# Patient Record
Sex: MALE | Employment: FULL TIME | URBAN - METROPOLITAN AREA
[De-identification: names, ages, dates, MRNs, and addresses within clinical notes are randomized per-mention and may not be internally consistent; named-entity substitution may affect disease eponyms.]

---

## 2017-01-26 ENCOUNTER — TELEPHONE (OUTPATIENT)
Dept: FAMILY MEDICINE | Facility: CLINIC | Age: 32
End: 2017-01-26

## 2017-01-26 ENCOUNTER — OFFICE VISIT (OUTPATIENT)
Dept: FAMILY MEDICINE | Facility: CLINIC | Age: 32
End: 2017-01-26

## 2017-01-26 VITALS
DIASTOLIC BLOOD PRESSURE: 99 MMHG | BODY MASS INDEX: 27.32 KG/M2 | WEIGHT: 190.8 LBS | SYSTOLIC BLOOD PRESSURE: 139 MMHG | OXYGEN SATURATION: 99 % | HEIGHT: 70 IN | HEART RATE: 86 BPM | TEMPERATURE: 99 F

## 2017-01-26 DIAGNOSIS — R20.0 NUMBNESS AND TINGLING: ICD-10-CM

## 2017-01-26 DIAGNOSIS — R20.2 NUMBNESS AND TINGLING: ICD-10-CM

## 2017-01-26 PROBLEM — M41.113 JUVENILE IDIOPATHIC SCOLIOSIS OF CERVICOTHORACIC REGION: Status: ACTIVE | Noted: 2017-01-26

## 2017-01-26 LAB
FOLATE SERPL-MCNC: 9.1 NG/ML
TSH SERPL DL<=0.05 MIU/L-ACNC: 1.53 UIU/ML (ref 0.3–5)
VIT B12 SERPL-MCNC: 792 PG/ML (ref 213–816)

## 2017-01-26 RX ORDER — PREDNISONE 20 MG/1
60 TABLET ORAL DAILY
Qty: 15 TABLET | Refills: 0 | Status: SHIPPED
Start: 2017-01-26 | End: 2017-02-13

## 2017-01-26 NOTE — PROGRESS NOTES
"SUBJECTIVE:  Adeola Del Angel is a 31 year old male with a PMH of     Patient Active Problem List   Diagnosis     Mild major depression (H)     Depression with anxiety     Juvenile idiopathic scoliosis of cervicothoracic region     Who presents for evaluation of   Chief Complaint   Patient presents with     Back Pain     have back pain for couple years now per pt.      Other     have tingling and numbness on hands and legs since yesterday per pt.    He reports a history of scoliosis and chronic back pain in thoracic area.  He notes frequent subscapular discomfort.  Last night had symptoms of upper and lower extremities are falling asleep.  No history of trauma.  Worsens with laborious work, improves with rest.     No chest pain, no shortness of breath, no nausea or vomiting, no symptoms concerning for cauda equina.  No fevers.    Mother notes that his father recently had a stroke and is in the hospital and so family is concerned about Adeola's new symptoms.    Smokes 1/2 ppd, Occasional Etoh.      OBJECTIVE:  /99 mmHg  Pulse 86  Temp(Src) 99  F (37.2  C) (Oral)  Ht 5' 10\" (177.8 cm)  Wt 190 lb 12.8 oz (86.546 kg)  BMI 27.38 kg/m2  SpO2 99%  EXAM:  Constitutional: healthy, alert and no distress   NEURO: Gait normal. Reflexes normal and symmetric. Sensation grossly WNL.  JOINT/EXTREMITIES: extremities normal- no gross deformities noted, gait normal and normal muscle tone      ASSESSMENT:  Adeola was seen today for back pain and other.    Diagnoses and all orders for this visit:    Numbness and tingling  -     predniSONE (DELTASONE) 20 MG tablet; Take 3 tablets (60 mg) by mouth daily  -     TSH  Sensitive ([facility])  -     Vitamin B12 ([facility])  -     Vitamin D 25-Hydroxy ([facility])  -     Folate  Serum ([facility])  -     Cancel: XR Lumbar Spine 1 View; Future  -     Cancel: XR Thoracic Spine 1 View; Future  -     Cancel: XR Cervical Spine 1 View; Future  -     XR Lumbar Spine 2-3 Views*  -     XR " CERVICAL SPINE 2/3 VWS  -     XR THORACIC SPINE 3 VW      Total of 20 minutes was spent in face to face contact with patient with > 50% in counseling and coordination of care.  Options for treatment and/or follow-up care were reviewed with the patient. Adeola Del Angel was engaged and actively involved in the decision making process. He verbalized understanding of the options discussed and was satisfied with the final plan.  RTC in 1 week for follow up of Numbness and tingling or sooner if develops new or worsening symptoms.    Discussed with Dr Amelia Briggs.     Jhon Cole    Addendum:  1/31/2017  Called and spoke with patient regarding Xray results, L, T and C spine images negative.   Will order PT.  Steroids were mildly helpful.    Started megadosing of Vitamin D.    Jhon Cole MD

## 2017-01-26 NOTE — NURSING NOTE
Do you feel safe at home?  Yes  Do you feel safe in your relationship?  Yes  Have there been threats or abuse to you or your family?  No

## 2017-01-26 NOTE — PATIENT INSTRUCTIONS
Get Xrays done    Get blood drawn.    Start taking prednisone 60 mg once a day for 5 days    Make a follow up appointment for early next week    [facility]  [phone]  Referral has been faxed they will contact pt to schedule

## 2017-01-26 NOTE — MR AVS SNAPSHOT
After Visit Summary   2017    Adeola Del Angel    MRN: [id number]           Patient Information     Date Of Birth          1985        Visit Information        Provider Department      2017 1:10 PM Jhon Cole MD [facility]        Today's Diagnoses     Numbness and tingling           Care Instructions    Get Xrays done    Get blood drawn.    Start taking prednisone 60 mg once a day for 5 days    Make a follow up appointment for early next week        Follow-ups after your visit        Future tests that were ordered for you today     Open Future Orders        Priority Expected Expires Ordered    XR Lumbar Spine 1 View Routine 2017    XR Thoracic Spine 1 View Routine 2017    XR Cervical Spine 1 View Routine 2017            Who to contact     Please call your clinic at [phone] to:    Ask questions about your health    Make or cancel appointments    Discuss your medicines    Learn about your test results    Speak to your doctor   If you have compliments or concerns about an experience at your clinic, or if you wish to file a complaint, please contact [facility] Patient Relations at [phone] or email us at [email or web address]         Additional Information About Your Visit        MyChart Information     [employer] is an electronic gateway that provides easy, online access to your medical records. With [employer], you can request a clinic appointment, read your test results, renew a prescription or communicate with your care team.     To sign up for [employer] visit the website at [email or web address]   You will be asked to enter the access code listed below, as well as some personal information. Please follow the directions to create your username and password.     Your access code is: [email or web address]  Expires: 2017  1:48 PM     Your access code will  in  "90 days. If you need help or a new code, please contact your [facility] or call [phone] for assistance.        Care EveryWhere ID     This is your Care EveryWhere ID. This could be used by other organizations to access your [place] medical records  [id number]        Your Vitals Were     Pulse Temperature Height BMI (Body Mass Index) Pulse Oximetry       86 99  F (37.2  C) (Oral) 5' 10\" (177.8 cm) 27.38 kg/m2 99%        Blood Pressure from Last 3 Encounters:   01/26/17 139/99   10/21/16 137/89   08/22/16 134/91    Weight from Last 3 Encounters:   01/26/17 190 lb 12.8 oz (86.546 kg)   10/21/16 190 lb 12.8 oz (86.546 kg)   08/22/16 191 lb (86.637 kg)              We Performed the Following     Folate  Serum ([facility])     TSH  Sensitive ([facility])     Vitamin B12 ([facility])     Vitamin D 25-Hydroxy ([facility])          Today's Medication Changes          These changes are accurate as of: 1/26/17  1:48 PM.  If you have any questions, ask your nurse or doctor.               Start taking these medicines.        Dose/Directions    predniSONE 20 MG tablet   Commonly known as:  DELTASONE   Used for:  Numbness and tingling   Started by:  Jhon Cole MD        Dose:  60 mg   Take 3 tablets (60 mg) by mouth daily   Quantity:  15 tablet   Refills:  0            Where to get your medicines      These medications were sent to [facility] - [place], [place] - [address], [place] [address]     Phone:  [phone]    - predniSONE 20 MG tablet             Primary Care Provider Office Phone # Fax #    Obed Gomez Perez [phone]       [address] [address]        Thank you!     Thank you for choosing [facility]  for your care. Our goal is always to provide you with excellent care. Hearing back from our patients is one way we can continue to improve our services. Please take a few minutes " to complete the written survey that you may receive in the mail after your visit with us. Thank you!             Your Updated Medication List - Protect others around you: Learn how to safely use, store and throw away your medicines at www.disposemymeds.org.          This list is accurate as of: 1/26/17  1:48 PM.  Always use your most recent med list.                   Brand Name Dispense Instructions for use    acetaminophen 500 MG tablet    TYLENOL    100 tablet    Take 2 tablets (1,000 mg) by mouth every 6 hours as needed for mild pain       * Alum & Mag Hydroxide-Simeth 225-200-25 MG/5ML Susp     1120 mL    Take 1 capful by mouth 4 times daily as needed.       * alum & mag hydroxide-simethicone 200-200-20 MG/5ML Susp suspension    MYLANTA/MAALOX    1 Bottle    Take 30 mLs by mouth every 4 hours as needed for indigestion       escitalopram 10 MG tablet    LEXAPRO    30 tablet    Take 1 tablet (10 mg) by mouth daily       LANsoprazole 30 MG CR capsule    PREVACID    30 capsule    Take 1 capsule by mouth daily. Take 30-60 minutes before a meal.       omeprazole 20 MG CR capsule    priLOSEC    30 capsule    Take 1 capsule (20 mg) by mouth 2 times daily       PEPCID 20 MG tablet   Generic drug:  famotidine      Take 20 mg by mouth 2 times daily.       polyethylene glycol powder    MIRALAX    510 g    Take 17 g by mouth daily.       predniSONE 20 MG tablet    DELTASONE    15 tablet    Take 3 tablets (60 mg) by mouth daily       psyllium 0.52 G capsule     100 capsule    Take 1 capsule by mouth daily.       * Notice:  This list has 2 medication(s) that are the same as other medications prescribed for you. Read the directions carefully, and ask your doctor or other care provider to review them with you.

## 2017-01-26 NOTE — TELEPHONE ENCOUNTER
Patient calling with c/o back pain with leg/ arm numbness that started yesterday. He states the sensation in his extremities feel like they have went to sleep but he is still able to move them appropriately. He has been told in the past that he has had some muscle problems so he is not sure if he injured himself unknowingly or turned wrong or something. Nurse suggested the patient come in to be seen today vs waiting until tomorrow. Patient verbalized understanding and was transferred to appt. Appt was scheduled for an appt this afternoon with Dr. Turner. /NYA Adams      Routed to Dr. Cole. /Bryan RN

## 2017-01-26 NOTE — Clinical Note
January 31, 2017        Adeola Del Angel  [address] [place] [address]    To Whom it May Concern:    Adeola Del Angel was seen in our office on 1/31/2017 and was given the following instructions:  Patient is able to lift 10 pounds at a time and Patient is able to stand/walk for up to 8 hours.    Sincerely,        Jhon Cole MD

## 2017-01-26 NOTE — TELEPHONE ENCOUNTER
[facility] phone call message- general phone call:    Reason for call: He is having back pain and now his legs are arms are tingling and feeling numb. He wants to know if he should schedule today or go to work tonight and come in tomorrow.    Return call needed: Yes    OK to leave a message on voice mail? Yes    Primary language: English      needed? No    Call taken on January 26, 2017 at 10:45 AM by Pushpa Castañeda

## 2017-01-26 NOTE — PROGRESS NOTES
"Preceptor attestation:  Vital signs reviewed: /99 mmHg  Pulse 86  Temp(Src) 99  F (37.2  C) (Oral)  Ht 5' 10\" (177.8 cm)  Wt 190 lb 12.8 oz (86.546 kg)  BMI 27.38 kg/m2  SpO2 99%    Patient seen and discussed with the resident.  Assessment and plan reviewed with resident and agreed upon.    Supervising physician: Amelia Briggs  [facility]  "

## 2017-01-26 NOTE — Clinical Note
January 31, 2017        Adeola Del Angel  [address] [place] [address]    To Whom it May Concern:    Adeola Del Angel was seen in our office on 1/26/2017 and was given the following instructions:  Patient is able to lift 10 pounds at a time. and Patient is able to stand/walk for up to 8 hours.    Sincerely,        Jhon Cole MD

## 2017-01-27 LAB — 25(OH)D3 SERPL-MCNC: 9 NG/ML (ref 30–80)

## 2017-01-27 ASSESSMENT — PATIENT HEALTH QUESTIONNAIRE - PHQ9: SUM OF ALL RESPONSES TO PHQ QUESTIONS 1-9: 4

## 2017-01-30 ENCOUNTER — TELEPHONE (OUTPATIENT)
Dept: FAMILY MEDICINE | Facility: CLINIC | Age: 32
End: 2017-01-30

## 2017-01-30 NOTE — TELEPHONE ENCOUNTER
[facility] phone call message- patient requesting results:    Test: Lab/ X-RAY     Date of test: LAST WEEK     Additional Comments: Would like a call back in regards to his lab results and x-ray. Please call back.     OK to leave a message on voice mail? Yes    Primary language: English      needed? No    Call taken on January 30, 2017 at 9:00 AM by Shane Guzman

## 2017-01-30 NOTE — TELEPHONE ENCOUNTER
Component      Latest Ref Rng 1/26/2017   TSH      0.30 - 5.00 uIU/mL 1.53   Vitamin B12      213 - 816 pg/mL 792   Vitamin D,25-Hydroxy      30.0 - 80.0 ng/mL 9.0 (L)   Folate      >=3.5 ng/mL 9.1     Gave msg above.   should he take vit D supplements.   I advised pt on some Vit D rich foods (cereal, vit D fortified orange juice). Pt also would like results of x-ray. Waiting for note from MD.     Routed to Dr. Cole. /NYA Hanna

## 2017-01-31 ENCOUNTER — TELEPHONE (OUTPATIENT)
Dept: FAMILY MEDICINE | Facility: CLINIC | Age: 32
End: 2017-01-31

## 2017-01-31 RX ORDER — ERGOCALCIFEROL 1.25 MG/1
50000 CAPSULE, LIQUID FILLED ORAL
Qty: 8 CAPSULE | Refills: 0 | Status: SHIPPED
Start: 2017-01-31 | End: 2017-03-22

## 2017-01-31 NOTE — TELEPHONE ENCOUNTER
[facility] phone call message- general phone call:    Reason for call: Pt is calling for lab results. He is wondering about the results because he is wondering if the doctor will need him to have an MRI or not.  Please call    Return call needed: Yes    OK to leave a message on voice mail? Yes    Primary language: English      needed? No    Call taken on January 31, 2017 at 10:20 AM by Lana Dixon

## 2017-02-02 ENCOUNTER — AMBULATORY - HEALTHEAST (OUTPATIENT)
Dept: ADMINISTRATIVE | Facility: REHABILITATION | Age: 32
End: 2017-02-02

## 2017-02-02 DIAGNOSIS — R20.0 NUMBNESS: ICD-10-CM

## 2017-02-02 DIAGNOSIS — R20.2 TINGLING: ICD-10-CM

## 2017-02-03 NOTE — PROGRESS NOTES
Quick Note:    Discussed patients results of imaging over the phone, ordered PT.  Jhon Cole    ______

## 2017-02-13 ENCOUNTER — OFFICE VISIT (OUTPATIENT)
Dept: FAMILY MEDICINE | Facility: CLINIC | Age: 32
End: 2017-02-13

## 2017-02-13 VITALS
TEMPERATURE: 98.3 F | HEART RATE: 79 BPM | DIASTOLIC BLOOD PRESSURE: 100 MMHG | WEIGHT: 195 LBS | SYSTOLIC BLOOD PRESSURE: 147 MMHG | BODY MASS INDEX: 27.98 KG/M2

## 2017-02-13 DIAGNOSIS — M54.6 CHRONIC MIDLINE THORACIC BACK PAIN: Primary | ICD-10-CM

## 2017-02-13 DIAGNOSIS — G89.29 CHRONIC MIDLINE THORACIC BACK PAIN: Primary | ICD-10-CM

## 2017-02-13 RX ORDER — NAPROXEN SODIUM 500 MG/1
500 TABLET, FILM COATED, EXTENDED RELEASE ORAL 2 TIMES DAILY
Qty: 60 TABLET | Refills: 0 | Status: SHIPPED
Start: 2017-02-13 | End: 2017-11-22

## 2017-02-13 NOTE — PROGRESS NOTES
SUBJECTIVE:  Adeola Del Angel is a 31 year old male with a PMH of    Patient Active Problem List   Diagnosis     Mild major depression (H)     Depression with anxiety     Juvenile idiopathic scoliosis of cervicothoracic region     Who presents for evaluation of   Chief Complaint   Patient presents with     RECHECK     Follow up on back pain, back is still in pain      Continues to have back pain in thoracic region, Pain 7-8/10, not taking any medications.  Numbness and tingling have resolved.    Denies chest pain, shortness, no nausea, vomiting, no changes in voiding or stooling, no loss of bowel or bladder.       OBJECTIVE:  BP (!) 147/100  Pulse 79  Temp 98.3  F (36.8  C) (Oral)  Wt 195 lb (88.5 kg)  BMI 27.98 kg/m2  EXAM:  Constitutional: healthy, alert and no distress   Cardiovascular: PMI normal. No lifts, heaves, or thrills. RRR. No murmurs, clicks gallops or rub  Respiratory: Percussion normal. Good diaphragmatic excursion. Lungs clear  JOINT/EXTREMITIES: extremities normal- no gross deformities noted, gait normal, normal muscle tone and Back pain is not reproducible, no significant paraspinal muscle spasm.      ASSESSMENT:  Adeola was seen today for recheck.  Will have patient schedule PT.    Diagnoses and all orders for this visit:    Chronic midline thoracic back pain  -     naproxen sodium 500 MG TB24; Take 500 mg by mouth 2 times daily    Total of 20 minutes was spent in face to face contact with patient with > 50% in counseling and coordination of care.  Options for treatment and/or follow-up care were reviewed with the patient. Adeola Del Angel was engaged and actively involved in the decision making process. He verbalized understanding of the options discussed and was satisfied with the final plan.  RTC in 1 month for follow up of back pain or sooner if develops new or worsening symptoms.    Discussed with Dr Dieudonne Chaidez.     Jhon Cole MD

## 2017-02-13 NOTE — MR AVS SNAPSHOT
After Visit Summary   2017    Adeola Del Angel    MRN: [id number]           Patient Information     Date Of Birth          1985        Visit Information        Provider Department      2017 4:10 PM Jhon Cole MD [facility]        Today's Diagnoses     Chronic midline thoracic back pain    -  1       Follow-ups after your visit        Who to contact     Please call your clinic at [phone] to:    Ask questions about your health    Make or cancel appointments    Discuss your medicines    Learn about your test results    Speak to your doctor   If you have compliments or concerns about an experience at your clinic, or if you wish to file a complaint, please contact [facility] Patient Relations at [phone] or email us at [email or web address]         Additional Information About Your Visit        MyChart Information     [employer] is an electronic gateway that provides easy, online access to your medical records. With [employer], you can request a clinic appointment, read your test results, renew a prescription or communicate with your care team.     To sign up for [employer] visit the website at [email or web address]   You will be asked to enter the access code listed below, as well as some personal information. Please follow the directions to create your username and password.     Your access code is: [email or web address]  Expires: 2017  1:48 PM     Your access code will  in 90 days. If you need help or a new code, please contact your [facility] or call [phone] for assistance.        Care EveryWhere ID     This is your Care EveryWhere ID. This could be used by other organizations to access your [place] medical records  [id number]        Your Vitals Were     Pulse Temperature BMI (Body Mass Index)             79 98.3  F (36.8  C) (Oral) 27.98 kg/m2          Blood Pressure from Last 3 Encounters:    02/13/17 (!) 147/100   01/26/17 (!) 139/99   10/21/16 137/89    Weight from Last 3 Encounters:   02/13/17 195 lb (88.5 kg)   01/26/17 190 lb 12.8 oz (86.5 kg)   10/21/16 190 lb 12.8 oz (86.5 kg)              Today, you had the following     No orders found for display         Today's Medication Changes          These changes are accurate as of: 2/13/17 11:59 PM.  If you have any questions, ask your nurse or doctor.               Start taking these medicines.        Dose/Directions    naproxen sodium 500 MG Tb24   Used for:  Chronic midline thoracic back pain   Started by:  Jhon Cole MD        Dose:  500 mg   Take 500 mg by mouth 2 times daily   Quantity:  60 tablet   Refills:  0            Where to get your medicines      These medications were sent to [facility] - [place], [place] - [address], [place] [address]     Phone:  [phone]     naproxen sodium 500 MG Tb24                Primary Care Provider Office Phone # Fax #    Obed Perez [phone]       [address] [address]        Thank you!     Thank you for choosing [facility]  for your care. Our goal is always to provide you with excellent care. Hearing back from our patients is one way we can continue to improve our services. Please take a few minutes to complete the written survey that you may receive in the mail after your visit with us. Thank you!             Your Updated Medication List - Protect others around you: Learn how to safely use, store and throw away your medicines at www.disposemymeds.org.          This list is accurate as of: 2/13/17 11:59 PM.  Always use your most recent med list.                   Brand Name Dispense Instructions for use    naproxen sodium 500 MG Tb24     60 tablet    Take 500 mg by mouth 2 times daily       vitamin D 95239 UNIT capsule    ERGOCALCIFEROL    8 capsule    Take 1 capsule (50,000 Units) by mouth  every 7 days for 8 doses

## 2017-02-14 NOTE — PROGRESS NOTES
Preceptor attestation:  Patient seen and discussed with the resident. Assessment and plan reviewed with resident and agreed upon.  Supervising physician: Dieudonne Chaidez  [facility]

## 2017-04-27 ENCOUNTER — OFFICE VISIT (OUTPATIENT)
Dept: FAMILY MEDICINE | Facility: CLINIC | Age: 32
End: 2017-04-27

## 2017-04-27 VITALS
OXYGEN SATURATION: 99 % | HEIGHT: 70 IN | BODY MASS INDEX: 27.95 KG/M2 | HEART RATE: 98 BPM | WEIGHT: 195.25 LBS | SYSTOLIC BLOOD PRESSURE: 147 MMHG | DIASTOLIC BLOOD PRESSURE: 94 MMHG

## 2017-04-27 DIAGNOSIS — Z20.2 EXPOSURE TO STD: Primary | ICD-10-CM

## 2017-04-27 DIAGNOSIS — I10 BENIGN ESSENTIAL HYPERTENSION: ICD-10-CM

## 2017-04-27 PROBLEM — R03.0 ELEVATED BLOOD PRESSURE READING WITHOUT DIAGNOSIS OF HYPERTENSION: Status: ACTIVE | Noted: 2017-04-27

## 2017-04-27 PROBLEM — R03.0 ELEVATED BLOOD PRESSURE READING WITHOUT DIAGNOSIS OF HYPERTENSION: Status: RESOLVED | Noted: 2017-04-27 | Resolved: 2017-04-27

## 2017-04-27 RX ORDER — AZITHROMYCIN 500 MG/1
1000 TABLET, FILM COATED ORAL ONCE
Qty: 2 TABLET | Refills: 0 | Status: SHIPPED | OUTPATIENT
Start: 2017-04-27 | End: 2017-04-27

## 2017-04-27 RX ORDER — CEFTRIAXONE SODIUM 250 MG/1
250 INJECTION, POWDER, FOR SOLUTION INTRAMUSCULAR; INTRAVENOUS ONCE
Qty: 2.5 ML | Refills: 0 | OUTPATIENT
Start: 2017-04-27 | End: 2017-04-27

## 2017-04-27 ASSESSMENT — ANXIETY QUESTIONNAIRES
7. FEELING AFRAID AS IF SOMETHING AWFUL MIGHT HAPPEN: SEVERAL DAYS
5. BEING SO RESTLESS THAT IT IS HARD TO SIT STILL: NOT AT ALL
6. BECOMING EASILY ANNOYED OR IRRITABLE: SEVERAL DAYS
3. WORRYING TOO MUCH ABOUT DIFFERENT THINGS: SEVERAL DAYS
GAD7 TOTAL SCORE: 6
1. FEELING NERVOUS, ANXIOUS, OR ON EDGE: SEVERAL DAYS
2. NOT BEING ABLE TO STOP OR CONTROL WORRYING: SEVERAL DAYS
IF YOU CHECKED OFF ANY PROBLEMS ON THIS QUESTIONNAIRE, HOW DIFFICULT HAVE THESE PROBLEMS MADE IT FOR YOU TO DO YOUR WORK, TAKE CARE OF THINGS AT HOME, OR GET ALONG WITH OTHER PEOPLE: NOT DIFFICULT AT ALL

## 2017-04-27 ASSESSMENT — PATIENT HEALTH QUESTIONNAIRE - PHQ9: 5. POOR APPETITE OR OVEREATING: SEVERAL DAYS

## 2017-04-27 NOTE — PATIENT INSTRUCTIONS
Controlling High Blood Pressure  High blood pressure (hypertension) is called the silent killer. This is because many people who have it don t know it. High blood pressure is 140/90 or higher. Know your blood pressure and remember to check it regularly. Doing so can save your life. Here are some things you can do to help control your blood pressure.    Choose heart-healthy foods    Select low-salt, low-fat foods.    Limit canned, dried, cured, packaged, and fast foods. These can contain a lot of salt.    Eat 8 to 10 servings of  fruits and vegetables every day.    Choose lean meats, fish, or chicken.    Eat whole-grain pasta, brown rice, and beans.    Eat 2 to 3 servings of low-fat or fat-free dairy products    Ask your doctor about the DASH eating plan. This plan helps reduce blood pressure.  Maintain a healthy weight    Ask your health care provider how many calories to eat a day. Then stick to that number.    Ask your health care provider what weight range is healthiest for you. If you are overweight, a weight loss of only 3% to 5% of your body weight can help lower blood pressure.    Limit snacks and sweets.    Get regular exercise.  Get up and get active    Choose activities you enjoy. Find ones you can do with friends or family.    Park farther away from building entrances.    Use stairs instead of the elevator.    When you can, walk or bike instead of driving.    Bingen leaves, garden, or do household repairs.    Be active at a moderate to vigorous level of physical activity for at least 40 minutes for a minimum of 3 to 4 days a week.   Manage stress    Make time to relax and enjoy life. Find time to laugh.    Visit with family and friends, and keep up with hobbies.  Limit alcohol and quit smoking    Men should have no more than 2 drinks per day.    Women should have no more than 1 drink per day.    Talk with your health care provider about quitting smoking. Smoking increases your risk for heart disease and  stroke. Ask about local or community programs that can help.  Medications  If lifestyle changes aren t enough, your health care provider may prescribe high blood pressure medicine. Take all medications as prescribed.     1757-6224 The [employer]. [address] [place] [address], [place] [address]. All rights reserved. This information is not intended as a substitute for professional medical care. Always follow your healthcare professional's instructions.          Chlamydia Urethritis, Treated (Male)  You have an infection in the channel in the penis that carries urine (urethra). The infection is caused by the bacteria chlamydia. This infection is a sexually transmitted disease (STD). It is highly contagious. It's spread by sexual contact with an infected partner.    Symptoms most often begin within 1 week after you come in contact with the bacteria. But it may take 3 weeks for symptoms to show up. You may have a watery discharge from the penis and burning during urination.   This infection can be treated and cured. Treatment is with antibiotic medicine.  Home care  Follow these guidelines when caring for yourself at home:    Your sexual partner needs to be treated even if he or she has no symptoms. Your partner should contact his or her own health care provider. Or your partner can go to an urgent care clinic or your local health department to be examined and treated.    Avoid sexual activity until both you and your partner have finished all antibiotic medicine. You should wait until your provider tells you that you are no longer contagious.    Take all antibiotic medicine as directed until it is finished. If you stop the medicine before you have finished it, symptoms may come back.    Learn about  safe sex  practices and use these in the future. The safest sex is with a partner who has tested negative and has sex only with you. Condoms can help stop spreading some STDs. These include gonorrhea, chlamydia, and  HIV. But condoms are not a guarantee.  Follow-up care  Follow up with your health care provider, or as advised. If a culture test was taken, you may call as advised for the results. You should have another culture test 4 to 6 weeks after treatment, This is to make sure the infection is gone. Call your provider or your local health department for complete STD screening. This includes HIV testing. Call the [facility] National STD Hotline at 819-376-2412 for more information about STDs.  When to seek medical advice  Call your health care provider right away if any of these occur:    You don t get better after 3 days of treatment    You can t urinate because of the pain    Rash or joint pain    Painful sores on the penis    Enlarged painful lumps (lymph nodes) in the groin    Testicle pain or swelling of the scrotum    Fever greater than 101 F (38.3 C).    Blood in urine     3766-5300 [employer]. [address] [place] [address]. All rights reserved. This information is not intended as a substitute for professional medical care. Always follow your healthcare professional's instructions.

## 2017-04-27 NOTE — LETTER
May 1, 2017      Undre Houghton  [address] [place] [address]        Hi Undre, there was no chlamydia nor gonorrhea in your urine.  Hopefully you are feeling better - the blood tests are ordered for whenever you can make it in to our lab    Please see below for your test results.    Resulted Orders   Chlamydia/Gono Amplified (Graphicly)   Result Value Ref Range    Chlamydia trac,Amplified Prb Negative Negative    N gonorrhoeae,Amplified Prb Negative Negative    Narrative    Test performed by:  [facility] [address] [place] [address]       If you have any questions, please call the clinic to make an appointment.    Sincerely,    Orion Melara MD

## 2017-04-27 NOTE — MR AVS SNAPSHOT
After Visit Summary   4/27/2017    Adeola Del Angel    MRN: [id number]           Patient Information     Date Of Birth          1985        Visit Information        Provider Department      4/27/2017 3:10 PM [facility]        Today's Diagnoses     Exposure to STD    -  1    Elevated blood pressure reading without diagnosis of hypertension          Care Instructions        Controlling High Blood Pressure  High blood pressure (hypertension) is called the silent killer. This is because many people who have it don t know it. High blood pressure is 140/90 or higher. Know your blood pressure and remember to check it regularly. Doing so can save your life. Here are some things you can do to help control your blood pressure.    Choose heart-healthy foods    Select low-salt, low-fat foods.    Limit canned, dried, cured, packaged, and fast foods. These can contain a lot of salt.    Eat 8 to 10 servings of  fruits and vegetables every day.    Choose lean meats, fish, or chicken.    Eat whole-grain pasta, brown rice, and beans.    Eat 2 to 3 servings of low-fat or fat-free dairy products    Ask your doctor about the DASH eating plan. This plan helps reduce blood pressure.  Maintain a healthy weight    Ask your health care provider how many calories to eat a day. Then stick to that number.    Ask your health care provider what weight range is healthiest for you. If you are overweight, a weight loss of only 3% to 5% of your body weight can help lower blood pressure.    Limit snacks and sweets.    Get regular exercise.  Get up and get active    Choose activities you enjoy. Find ones you can do with friends or family.    Park farther away from building entrances.    Use stairs instead of the elevator.    When you can, walk or bike instead of driving.    [place] leaves, garden, or do household repairs.    Be active at a moderate to vigorous level of physical activity for at least 40 minutes for a  minimum of 3 to 4 days a week.   Manage stress    Make time to relax and enjoy life. Find time to laugh.    Visit with family and friends, and keep up with hobbies.  Limit alcohol and quit smoking    Men should have no more than 2 drinks per day.    Women should have no more than 1 drink per day.    Talk with your health care provider about quitting smoking. Smoking increases your risk for heart disease and stroke. Ask about local or community programs that can help.  Medications  If lifestyle changes aren t enough, your health care provider may prescribe high blood pressure medicine. Take all medications as prescribed.     7685-3961 [employer]. [address] [place] [address]. All rights reserved. This information is not intended as a substitute for professional medical care. Always follow your healthcare professional's instructions.          Chlamydia Urethritis, Treated (Male)  You have an infection in the channel in the penis that carries urine (urethra). The infection is caused by the bacteria chlamydia. This infection is a sexually transmitted disease (STD). It is highly contagious. It's spread by sexual contact with an infected partner.    Symptoms most often begin within 1 week after you come in contact with the bacteria. But it may take 3 weeks for symptoms to show up. You may have a watery discharge from the penis and burning during urination.   This infection can be treated and cured. Treatment is with antibiotic medicine.  Home care  Follow these guidelines when caring for yourself at home:    Your sexual partner needs to be treated even if he or she has no symptoms. Your partner should contact his or her own health care provider. Or your partner can go to an urgent care clinic or your local health department to be examined and treated.    Avoid sexual activity until both you and your partner have finished all antibiotic medicine. You should wait until your provider tells you  that you are no longer contagious.    Take all antibiotic medicine as directed until it is finished. If you stop the medicine before you have finished it, symptoms may come back.    Learn about  safe sex  practices and use these in the future. The safest sex is with a partner who has tested negative and has sex only with you. Condoms can help stop spreading some STDs. These include gonorrhea, chlamydia, and HIV. But condoms are not a guarantee.  Follow-up care  Follow up with your health care provider, or as advised. If a culture test was taken, you may call as advised for the results. You should have another culture test 4 to 6 weeks after treatment, This is to make sure the infection is gone. Call your provider or your local health department for complete STD screening. This includes HIV testing. Call the [facility] National STD Hotline at 811-151-0779 for more information about STDs.  When to seek medical advice  Call your health care provider right away if any of these occur:    You don t get better after 3 days of treatment    You can t urinate because of the pain    Rash or joint pain    Painful sores on the penis    Enlarged painful lumps (lymph nodes) in the groin    Testicle pain or swelling of the scrotum    Fever greater than 101 F (38.3 C).    Blood in urine     0610-7277 [employer]. [address] [place] [address], [place] [address]. All rights reserved. This information is not intended as a substitute for professional medical care. Always follow your healthcare professional's instructions.              Follow-ups after your visit        Follow-up notes from your care team     Return if symptoms worsen or fail to improve.      Future tests that were ordered for you today     Open Future Orders        Priority Expected Expires Ordered    Hepatitis B Surface Ag (Appinions) Routine 5/4/2017 6/27/2017 4/27/2017    HIV Ag/Ab Screen [place] (Healthidealista.com) Routine 5/4/2017 6/27/2017 4/27/2017    Syphilis Screen  "[place] (RPR/VDRL) (Informed Trades) Routine 2017            Who to contact     Please call your clinic at [phone] to:    Ask questions about your health    Make or cancel appointments    Discuss your medicines    Learn about your test results    Speak to your doctor   If you have compliments or concerns about an experience at your clinic, or if you wish to file a complaint, please contact [facility] Patient Relations at [phone] or email us at [email or web address]         Additional Information About Your Visit        [employer] SpecialtiesharClickFacts Information     TheCityGame is an electronic gateway that provides easy, online access to your medical records. With TheCityGame, you can request a clinic appointment, read your test results, renew a prescription or communicate with your care team.     To sign up for TheCityGame visit the website at [email or web address]   You will be asked to enter the access code listed below, as well as some personal information. Please follow the directions to create your username and password.     Your access code is: [email or web address]  Expires: 2017  3:48 PM     Your access code will  in 90 days. If you need help or a new code, please contact your [facility] or call [phone] for assistance.        Care EveryWhere ID     This is your Care EveryWhere ID. This could be used by other organizations to access your [place] medical records  [id number]        Your Vitals Were     Pulse Height Pulse Oximetry BMI (Body Mass Index)          98 5' 10.08\" (178 cm) 99% 27.95 kg/m2         Blood Pressure from Last 3 Encounters:   17 (!) 147/94   17 (!) 147/100   17 (!) 139/99    Weight from Last 3 Encounters:   17 195 lb 4 oz (88.6 kg)   17 195 lb (88.5 kg)   17 190 lb 12.8 oz (86.5 kg)              We Performed the Following     Chlamydia/Gono Amplified (Informed Trades)        "   Today's Medication Changes          These changes are accurate as of: 4/27/17  3:48 PM.  If you have any questions, ask your nurse or doctor.               Start taking these medicines.        Dose/Directions    azithromycin 500 MG tablet   Commonly known as:  ZITHROMAX   Used for:  Exposure to STD   Started by:  Orion Melara MD        Dose:  1000 mg   Take 2 tablets (1,000 mg) by mouth once for 1 dose   Quantity:  2 tablet   Refills:  0       cefTRIAXone 250 MG injection   Commonly known as:  ROCEPHIN   Used for:  Exposure to STD   Started by:  Orion Melara MD        Dose:  250 mg   Inject 250 mg into the muscle once for 1 dose   Quantity:  2.5 mL   Refills:  0            Where to get your medicines      These medications were sent to [facility] - [place], [place] - [address], [place] [address]     Phone:  [phone]     azithromycin 500 MG tablet         Some of these will need a paper prescription and others can be bought over the counter.  Ask your nurse if you have questions.     You don't need a prescription for these medications     cefTRIAXone 250 MG injection                Primary Care Provider Office Phone # Fax #    Obed PerezDO [phone]       [address] [address]        Thank you!     Thank you for choosing [facility]  for your care. Our goal is always to provide you with excellent care. Hearing back from our patients is one way we can continue to improve our services. Please take a few minutes to complete the written survey that you may receive in the mail after your visit with us. Thank you!             Your Updated Medication List - Protect others around you: Learn how to safely use, store and throw away your medicines at www.disposemymeds.org.          This list is accurate as of: 4/27/17  3:48 PM.  Always use your most recent med list.                   Brand Name Dispense Instructions for  use    azithromycin 500 MG tablet    ZITHROMAX    2 tablet    Take 2 tablets (1,000 mg) by mouth once for 1 dose       cefTRIAXone 250 MG injection    ROCEPHIN    2.5 mL    Inject 250 mg into the muscle once for 1 dose       naproxen sodium 500 MG Tb24     60 tablet    Take 500 mg by mouth 2 times daily

## 2017-04-27 NOTE — NURSING NOTE
I administered the following to Adeola Del Angel.    MEDICATION: Rocephin 250mg and Lidocaine 0.9cc  ROUTE: IM  SITE: Deltoid - Right  DOSE: full 250 mb  LOT #: [id number]  :  Hospira  EXPIRATION DATE:  7/1/2019  NDC#: 0763-2705-07     Was entire vial of medication used? Yes    Name of provider who requested the injection: Dr. Melara  Name of provider on site at the time of performing the injection: Dr. Kelton Vallecillo [facility]

## 2017-04-27 NOTE — PROGRESS NOTES
SUBJECTIVE:  This is a 31-year-old male, not previously known to me, who attended today because a sexual partner was diagnosed with chlamydia two days ago.  He reported that he last had sex with her about a week ago and the condom burst.  He since had some minor burning w/urination and noticed a clear to greenish penile discharge.  He otherwise didn't feel unwell.  He previously was treated for an STD exposure several years ago, but the testing all came back negative.  We'll be conservative and treat for possible gonorrhea today also, following counseling.     Otherwise, I observed the fact that he had elevated blood pressure again today.  This was the third reading in recent months with elevated blood pressure, satisfying a diagnosis of hypertension.  He reported that he was under considerable work-related stress and he believed this is what caused the elevated blood pressure and that if the blood pressure were checked when he was at home, he would expect it to be normal.  He wasn't interested in taking medications at this time, but he agreed to f/u for recheck of blood pressure in the future.  He was a little short on time today, but he agreed to read the patient education materials that I've given him.   OBJECTIVE:   Vital signs were noted.  His blood pressure was elevated.  He declined genital exam, telling me it wasn't necessary.  UA was obtained to check for gonorrhea/chlamydia.   ASSESSMENT:     1.  Exposure to chlamydia.   2.  STI symptoms.   PLAN:  We treated for both gonorrhea and chlamydia.  We'll notify him of lab results.  He didn't have sufficient time to have a blood draw today, but he told me he'll return in the next few days for this, so orders were placed.  I gave him patient education materials concerning high blood pressure and chlamydia.  I've encouraged him to f/u, particularly to address elevated blood pressure at a future visit.

## 2017-04-28 LAB
C TRACH RRNA SPEC QL NAA+PROBE: NEGATIVE
N GONORRHOEA RRNA SPEC QL NAA+PROBE: NEGATIVE

## 2017-04-28 ASSESSMENT — PATIENT HEALTH QUESTIONNAIRE - PHQ9: SUM OF ALL RESPONSES TO PHQ QUESTIONS 1-9: 7

## 2017-04-28 ASSESSMENT — ANXIETY QUESTIONNAIRES: GAD7 TOTAL SCORE: 6

## 2017-06-16 ENCOUNTER — OFFICE VISIT (OUTPATIENT)
Dept: FAMILY MEDICINE | Facility: CLINIC | Age: 32
End: 2017-06-16

## 2017-06-16 VITALS
HEART RATE: 85 BPM | TEMPERATURE: 98.4 F | SYSTOLIC BLOOD PRESSURE: 130 MMHG | WEIGHT: 198 LBS | HEIGHT: 70 IN | BODY MASS INDEX: 28.35 KG/M2 | DIASTOLIC BLOOD PRESSURE: 91 MMHG

## 2017-06-16 DIAGNOSIS — R07.81 RIB PAIN ON LEFT SIDE: Primary | ICD-10-CM

## 2017-06-16 NOTE — PATIENT INSTRUCTIONS
An X-ray of your rib was done today, it looks OK. But we will have [facility] radiologist do a final reading.

## 2017-06-16 NOTE — PROGRESS NOTES
SUBJECTIVE:  Adeola is a 31-year-old male who injured his ribs at work a week ago while engaging in some horse play.  He reports that one of his buddies lifted him up and he felt a pop in the left anterior rib cage.  He reports that he had a fair amount of pain and tenderness for the first couple of days.  He reports that the pain has improved and is bearable at this point.  He reports that when he walks or takes a deep breath, he can feel a rubbing sensation over the rib where he was injured.  He hasn't been SOB.  He hasn't had any chest pressure or palpitations.  He hasn't been coughing up any phlegm or blood.  He reports that he is able to go about his usual activities, although he has a difficult time finding a comfortable spot to sleep in.  He hasn't had any broken ribs or pneumothoraces in the past.     Chronic problem list and medications were reviewed and updated.   REVIEW OF SYSTEMS:  No fevers, chills, or fatigue.   PHYSICAL EXAM:  Exam reveals well-appearing male in no acute distress.   LUNGS:  Clear to auscultation bilaterally.   HEART:  Regular rate and rhythm w/no murmurs, rubs or gallops.   MUSCULOSKELETAL:  Examination of the rib cage reveals tenderness over the lower ribs on the left anterior chest wall.  There is no crepitus associated with this.  Chest x-ray was obtained today with rib details, which revealed normal cardiac silhouette, no pneumothorax, and normal rib architecture.   ASSESSMENT:  Left rib contusion.   PLAN:  We'll treat with OTC NSAIDs and heat.  Follow up in two weeks if not improved or earlier if symptoms worsen.

## 2017-06-16 NOTE — MR AVS SNAPSHOT
After Visit Summary   2017    Adeola Del Angel    MRN: [id number]           Patient Information     Date Of Birth          1985        Visit Information        Provider Department      2017 2:10 PM Suresh Park MD [facility]        Today's Diagnoses     Rib pain on left side    -  1      Care Instructions    An X-ray of your rib was done today, it looks OK. But we will have [place] radiologist do a final reading.              Follow-ups after your visit        Follow-up notes from your care team     Return if symptoms worsen or fail to improve.      Who to contact     Please call your clinic at [phone] to:    Ask questions about your health    Make or cancel appointments    Discuss your medicines    Learn about your test results    Speak to your doctor   If you have compliments or concerns about an experience at your clinic, or if you wish to file a complaint, please contact [facility] Patient Relations at [phone] or email us at [email or web address]         Additional Information About Your Visit        MyChart Information     Evoz is an electronic gateway that provides easy, online access to your medical records. With Evoz, you can request a clinic appointment, read your test results, renew a prescription or communicate with your care team.     To sign up for Evoz visit the website at [email or web address]   You will be asked to enter the access code listed below, as well as some personal information. Please follow the directions to create your username and password.     Your access code is: [email or web address]  Expires: 2017  3:48 PM     Your access code will  in 90 days. If you need help or a new code, please contact your [facility] or call [phone] for assistance.        Care EveryWhere ID     This is your Care EveryWhere ID. This could be used by other organizations to  "access your [place] medical records  [id number]        Your Vitals Were     Pulse Temperature Height BMI (Body Mass Index)          85 98.4  F (36.9  C) (Oral) 5' 9.5\" (176.5 cm) 28.82 kg/m2         Blood Pressure from Last 3 Encounters:   06/16/17 (!) 130/91   04/27/17 (!) 147/94   02/13/17 (!) 147/100    Weight from Last 3 Encounters:   06/16/17 198 lb (89.8 kg)   04/27/17 195 lb 4 oz (88.6 kg)   02/13/17 195 lb (88.5 kg)              We Performed the Following     XR RIBS & CHEST LT G/E 3VW        Primary Care Provider Office Phone # Fax #    Obed Dyer DO Chris [phone]       [place] [address]        Thank you!     Thank you for choosing [facility]  for your care. Our goal is always to provide you with excellent care. Hearing back from our patients is one way we can continue to improve our services. Please take a few minutes to complete the written survey that you may receive in the mail after your visit with us. Thank you!             Your Updated Medication List - Protect others around you: Learn how to safely use, store and throw away your medicines at www.disposemymeds.org.          This list is accurate as of: 6/16/17 11:59 PM.  Always use your most recent med list.                   Brand Name Dispense Instructions for use    naproxen sodium 500 MG Tb24     60 tablet    Take 500 mg by mouth 2 times daily         "

## 2017-11-22 ENCOUNTER — OFFICE VISIT (OUTPATIENT)
Dept: FAMILY MEDICINE | Facility: CLINIC | Age: 32
End: 2017-11-22

## 2017-11-22 VITALS
WEIGHT: 201.6 LBS | SYSTOLIC BLOOD PRESSURE: 131 MMHG | OXYGEN SATURATION: 99 % | TEMPERATURE: 98.8 F | DIASTOLIC BLOOD PRESSURE: 89 MMHG | HEART RATE: 75 BPM | BODY MASS INDEX: 29.34 KG/M2

## 2017-11-22 DIAGNOSIS — H10.33 ACUTE CONJUNCTIVITIS OF BOTH EYES, UNSPECIFIED ACUTE CONJUNCTIVITIS TYPE: Primary | ICD-10-CM

## 2017-11-22 RX ORDER — POLYMYXIN B SULFATE AND TRIMETHOPRIM 1; 10000 MG/ML; [USP'U]/ML
1 SOLUTION OPHTHALMIC 4 TIMES DAILY
Qty: 2 ML | Refills: 0 | Status: SHIPPED | OUTPATIENT
Start: 2017-11-22 | End: 2017-11-29

## 2017-11-22 ASSESSMENT — ANXIETY QUESTIONNAIRES
5. BEING SO RESTLESS THAT IT IS HARD TO SIT STILL: NOT AT ALL
IF YOU CHECKED OFF ANY PROBLEMS ON THIS QUESTIONNAIRE, HOW DIFFICULT HAVE THESE PROBLEMS MADE IT FOR YOU TO DO YOUR WORK, TAKE CARE OF THINGS AT HOME, OR GET ALONG WITH OTHER PEOPLE: SOMEWHAT DIFFICULT
6. BECOMING EASILY ANNOYED OR IRRITABLE: SEVERAL DAYS
GAD7 TOTAL SCORE: 6
7. FEELING AFRAID AS IF SOMETHING AWFUL MIGHT HAPPEN: MORE THAN HALF THE DAYS
3. WORRYING TOO MUCH ABOUT DIFFERENT THINGS: SEVERAL DAYS
1. FEELING NERVOUS, ANXIOUS, OR ON EDGE: SEVERAL DAYS
2. NOT BEING ABLE TO STOP OR CONTROL WORRYING: NOT AT ALL

## 2017-11-22 ASSESSMENT — PATIENT HEALTH QUESTIONNAIRE - PHQ9
5. POOR APPETITE OR OVEREATING: SEVERAL DAYS
SUM OF ALL RESPONSES TO PHQ QUESTIONS 1-9: 6

## 2017-11-22 NOTE — MR AVS SNAPSHOT
After Visit Summary   2017    Adeola Del Angel    MRN: [id number]           Patient Information     Date Of Birth          1985        Visit Information        Provider Department      2017 1:10 PM Obed Perez,  [facility]        Today's Diagnoses     Acute conjunctivitis of both eyes, unspecified acute conjunctivitis type    -  1      Care Instructions    Thank you for coming to clinic today.  Please do not hesitate to call or return if you have any questions.    -  drops  - Follow-up within 1 week if not improving or sooner if you worsen or develop new symptoms    Sincerely,  Dr. Perez            Follow-ups after your visit        Who to contact     Please call your clinic at [phone] to:    Ask questions about your health    Make or cancel appointments    Discuss your medicines    Learn about your test results    Speak to your doctor   If you have compliments or concerns about an experience at your clinic, or if you wish to file a complaint, please contact [facility] Patient Relations at [phone] or email us at [email or web address]         Additional Information About Your Visit        MyChart Information     YAMAP is an electronic gateway that provides easy, online access to your medical records. With YAMAP, you can request a clinic appointment, read your test results, renew a prescription or communicate with your care team.     To sign up for YAMAP visit the website at [email or web address]   You will be asked to enter the access code listed below, as well as some personal information. Please follow the directions to create your username and password.     Your access code is: [email or web address]  Expires: 2018  1:37 PM     Your access code will  in 90 days. If you need help or a new code, please contact your [facility] or call [phone] for assistance.        Care  EveryWhere ID     This is your Care EveryWhere ID. This could be used by other organizations to access your [place] medical records  [id number]        Your Vitals Were     Pulse Temperature Pulse Oximetry BMI (Body Mass Index)          75 98.8  F (37.1  C) (Oral) 99% 29.34 kg/m2         Blood Pressure from Last 3 Encounters:   11/22/17 131/89   06/16/17 (!) 130/91   04/27/17 (!) 147/94    Weight from Last 3 Encounters:   11/22/17 201 lb 9.6 oz (91.4 kg)   06/16/17 198 lb (89.8 kg)   04/27/17 195 lb 4 oz (88.6 kg)              Today, you had the following     No orders found for display         Today's Medication Changes          These changes are accurate as of: 11/22/17  1:37 PM.  If you have any questions, ask your nurse or doctor.               Start taking these medicines.        Dose/Directions    trimethoprim-polymyxin b ophthalmic solution   Commonly known as:  POLYTRIM   Used for:  Acute conjunctivitis of both eyes, unspecified acute conjunctivitis type   Started by:  Obed Perez DO        Dose:  1 drop   Place 1 drop into both eyes 4 times daily for 7 days   Quantity:  2 mL   Refills:  0            Where to get your medicines      These medications were sent to [facility] - [place], [place] - [address], [place] [address]     Phone:  [phone]     trimethoprim-polymyxin b ophthalmic solution                Primary Care Provider Office Phone # Fax #    Obed Perez [phone]       [address] [place] [address]        Equal Access to Services     [facility]UNIA : Hadii nereida isaac hadasho Soomaali, waaxda luqadaha, qaybta kaalmada adeegyaconcepción, fabio idiin hayaan adeeg kharash la'aan . So [facility] [phone].    ATENCIÓN: Si habla español, tiene a farrar disposición servicios gratuitos de asistencia lingüística. Llame al [phone].    We comply with applicable federal civil rights laws and [place] laws. We do not discriminate on the  basis of race, color, national origin, age, disability, sex, sexual orientation, or gender identity.            Thank you!     Thank you for choosing [facility]  for your care. Our goal is always to provide you with excellent care. Hearing back from our patients is one way we can continue to improve our services. Please take a few minutes to complete the written survey that you may receive in the mail after your visit with us. Thank you!             Your Updated Medication List - Protect others around you: Learn how to safely use, store and throw away your medicines at www.disposemymeds.org.          This list is accurate as of: 11/22/17  1:37 PM.  Always use your most recent med list.                   Brand Name Dispense Instructions for use Diagnosis    naproxen sodium 500 MG Tb24     60 tablet    Take 500 mg by mouth 2 times daily    Chronic midline thoracic back pain       trimethoprim-polymyxin b ophthalmic solution    POLYTRIM    2 mL    Place 1 drop into both eyes 4 times daily for 7 days    Acute conjunctivitis of both eyes, unspecified acute conjunctivitis type

## 2017-11-22 NOTE — PROGRESS NOTES
Preceptor attestation:  Patient seen and discussed with the resident. Assessment and plan reviewed with resident and agreed upon.  Supervising physician: Suresh Park  [facility]

## 2017-11-22 NOTE — PATIENT INSTRUCTIONS
Thank you for coming to clinic today.  Please do not hesitate to call or return if you have any questions.    -  drops  - Follow-up within 1 week if not improving or sooner if you worsen or develop new symptoms    Sincerely,  Dr. Perez

## 2017-11-22 NOTE — PROGRESS NOTES
SUBJECTIVE       Adeola Del Angel is a 32 year old  male with a PMH significant for:     Patient Active Problem List   Diagnosis     Mild major depression (H)     Depression with anxiety     Juvenile idiopathic scoliosis of cervicothoracic region     Benign essential hypertension     Patient presents with:  Eye Problem: believe have pink eyes, it started yesterday per patient.       He presents today due to a possible eye infection.  He says yesterday he noticed his left eye was quite red and had yellow drainage.  He woke up this morning and it was stuck shut.  It has been irritated throughout the day.  Nobody else around him is sick.  He does not wear contact lenses.    PMH, Medications and Allergies were reviewed and updated as needed.    ROS: No photophobia.        OBJECTIVE     Vitals:    11/22/17 1330   BP: 131/89   Pulse: 75   Temp: 98.8  F (37.1  C)   TempSrc: Oral   SpO2: 99%   Weight: 201 lb 9.6 oz (91.4 kg)     Body mass index is 29.34 kg/(m^2).    General :  healthy and alert, no distress  HEENT:  PERRL, EOMI, Left eye has diffuse conjunctivitis with small amount of purulent discharge. No photophobia.  Right eye has a small area of conjunctival erythema medially, but is otherwise clear.  Skin:   no lesions or rashes   Neurological:  normal gait, no gross defects  Psychiatric:  appropriate mood and affect                        ASSESSMENT AND PLAN     (H10.33) Acute conjunctivitis of both eyes, unspecified acute conjunctivitis type  (primary encounter diagnosis)  Comment: We will opt to treat with antibiotics for bacterial conjunctivitis.  Discussed he should return within a week if he is not improving or sooner if he worsens.  Plan: trimethoprim-polymyxin b (POLYTRIM) ophthalmic         solution    RTC in 1 week if not improving for follow up or sooner if develops new or worsening symptoms.    Discussed with MD Obed Mane DO PGY3  [facility]    This note was  created with help of Dragon dictation system. Grammatical /typing errors are not intentional.

## 2017-11-23 ASSESSMENT — ANXIETY QUESTIONNAIRES: GAD7 TOTAL SCORE: 6

## 2021-12-28 DIAGNOSIS — Z11.52 ENCOUNTER FOR SCREENING LABORATORY TESTING FOR COVID-19 VIRUS: Primary | ICD-10-CM

## 2021-12-29 ENCOUNTER — LAB (OUTPATIENT)
Dept: LAB | Facility: CLINIC | Age: 36
End: 2021-12-29
Payer: COMMERCIAL

## 2021-12-29 DIAGNOSIS — Z11.52 ENCOUNTER FOR SCREENING LABORATORY TESTING FOR COVID-19 VIRUS: ICD-10-CM

## 2021-12-29 PROCEDURE — U0005 INFEC AGEN DETEC AMPLI PROBE: HCPCS

## 2021-12-29 PROCEDURE — U0003 INFECTIOUS AGENT DETECTION BY NUCLEIC ACID (DNA OR RNA); SEVERE ACUTE RESPIRATORY SYNDROME CORONAVIRUS 2 (SARS-COV-2) (CORONAVIRUS DISEASE [COVID-19]), AMPLIFIED PROBE TECHNIQUE, MAKING USE OF HIGH THROUGHPUT TECHNOLOGIES AS DESCRIBED BY CMS-2020-01-R: HCPCS

## 2021-12-30 LAB — SARS-COV-2 RNA RESP QL NAA+PROBE: NEGATIVE

## 2022-01-15 ENCOUNTER — HEALTH MAINTENANCE LETTER (OUTPATIENT)
Age: 37
End: 2022-01-15

## 2023-01-07 ENCOUNTER — HEALTH MAINTENANCE LETTER (OUTPATIENT)
Age: 38
End: 2023-01-07

## 2023-04-22 ENCOUNTER — HEALTH MAINTENANCE LETTER (OUTPATIENT)
Age: 38
End: 2023-04-22

## 2024-06-29 ENCOUNTER — HEALTH MAINTENANCE LETTER (OUTPATIENT)
Age: 39
End: 2024-06-29